# Patient Record
Sex: MALE | URBAN - METROPOLITAN AREA
[De-identification: names, ages, dates, MRNs, and addresses within clinical notes are randomized per-mention and may not be internally consistent; named-entity substitution may affect disease eponyms.]

---

## 2024-04-29 ENCOUNTER — TELEPHONE (OUTPATIENT)
Age: 54
End: 2024-04-29

## 2024-04-29 NOTE — TELEPHONE ENCOUNTER
Reason for call:  pt sister states that her brother has had a major stroke and does not have a PCP, caller states that [facility] is the PCP for aunt Nicci Child and his mother, Kenzie Nelson. As well as a cousin.   He wants to est with [facility] and have a hosp f/u.      Is this a new problem: Yes    Date of last appointment:  Visit date not found     Can we respond via [facility]: No    Best call back number:     wood,marin [phone]

## 2024-04-30 NOTE — TELEPHONE ENCOUNTER
Spoke to patient's sister, Jadon.  Advised her that Dr. Medina cannot see patient for a hospital f/u and establish.